# Patient Record
Sex: MALE | Race: WHITE | NOT HISPANIC OR LATINO
[De-identification: names, ages, dates, MRNs, and addresses within clinical notes are randomized per-mention and may not be internally consistent; named-entity substitution may affect disease eponyms.]

---

## 2023-05-17 ENCOUNTER — TRANSCRIBE ORDERS (OUTPATIENT)
Dept: OTHER | Age: 38
End: 2023-05-17

## 2023-05-17 DIAGNOSIS — I86.2 PELVIC VARICES: Primary | ICD-10-CM

## 2023-10-25 ENCOUNTER — TELEPHONE (OUTPATIENT)
Dept: UROLOGY | Facility: CLINIC | Age: 38
End: 2023-10-25

## 2023-10-25 DIAGNOSIS — N46.9 MALE INFERTILITY: Primary | ICD-10-CM

## 2023-10-25 NOTE — TELEPHONE ENCOUNTER
Please place infertility orders for Dr. Vegas and send back to me.    Thank you,    aNnci casey Clinic Assistant- Surgical Specialties

## 2023-10-25 NOTE — TELEPHONE ENCOUNTER
Orders placed.     Routed to clinic assistant to contact patient and to send mychart and letters.     Nessa Bronson LPN on 10/25/2023 at 11:35 AM

## 2023-10-25 NOTE — TELEPHONE ENCOUNTER
Left generic voicemail for patient letting him know there will be a letter in the mail with further instructions. Letter sent. MyChart not available.    Nanci casey Clinic Assistant- Surgical Specialties

## 2023-11-06 ENCOUNTER — TELEPHONE (OUTPATIENT)
Dept: UROLOGY | Facility: CLINIC | Age: 38
End: 2023-11-06
Payer: COMMERCIAL

## 2023-11-06 NOTE — TELEPHONE ENCOUNTER
Requested medical records to be faxed to [phone] and all related imaging to be pushed to PACS from [facility] in [place].Imaging request form brought downstairs.

## 2023-11-06 NOTE — TELEPHONE ENCOUNTER
Spoke with patient. He is unable to get labs done prior to appointment. He will get them done afterwards.

## 2023-11-06 NOTE — TELEPHONE ENCOUNTER
[facility] Call Center    Phone Message    May a detailed message be left on voicemail: yes     Reason for Call: Other: Pt is calling to inform he received a letter on Friday stating he needs to get labs done. Pt is wondering if he needs to get them before his appt on 11/8/23 haresh/ nory. Please advise pt. Thank you.       Action Taken: Message routed to:  Other: [facility]    Travel Screening: Not Applicable

## 2023-11-06 NOTE — TELEPHONE ENCOUNTER
Nanci Dawn    11/6/23  9:58 AM  Note  Spoke with patient. He is unable to get labs done prior to appointment. He will get them done afterwards.           Documented in another encounter. Closing encounter    Nessa Bronson LPN on 11/6/2023 at 10:05 AM

## 2023-11-08 ENCOUNTER — TELEPHONE (OUTPATIENT)
Dept: UROLOGY | Facility: CLINIC | Age: 38
End: 2023-11-08

## 2023-11-08 ENCOUNTER — OFFICE VISIT (OUTPATIENT)
Dept: UROLOGY | Facility: CLINIC | Age: 38
End: 2023-11-08
Attending: UROLOGY
Payer: COMMERCIAL

## 2023-11-08 DIAGNOSIS — I86.2 PELVIC VARICES: ICD-10-CM

## 2023-11-08 PROCEDURE — 99204 OFFICE O/P NEW MOD 45 MIN: CPT | Performed by: UROLOGY

## 2023-11-08 RX ORDER — ESCITALOPRAM OXALATE 5 MG/1
TABLET ORAL
COMMUNITY
Start: 2023-11-02

## 2023-11-08 RX ORDER — TRAMADOL HYDROCHLORIDE 50 MG/1
50 TABLET ORAL EVERY 6 HOURS PRN
COMMUNITY
Start: 2023-07-31

## 2023-11-08 NOTE — TELEPHONE ENCOUNTER
[facility] Call Center    Phone Message    May a detailed message be left on voicemail: yes     Reason for Call: Other: Pt called in asking for a return phone call from Dr Vegas after today's appt. Pt states he was recently diagnosed with something and forgot to ask an important questions on if he was able to do or not do something. Pt would not go into specific details. Please review and call pt back to discuss further. Thank you.      Action Taken: [facility] UROLOGY ADULT CUATE MATHEWS [[id number]]    Travel Screening: Not Applicable

## 2023-11-08 NOTE — PROGRESS NOTES
HPI:  Tung Ba is a 38 year old male being seen for follow-up penile pain.    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  Status-post left sperm granuloma excision 2015 and eventually vasovasostomy by me 2016 which fully resolved his testis pain. He's subsequently had another child since the vasovasostomy.    Started having some nonspecific new penile and pelvic pain starting March 2021- running and working out a lot at that time, which is the only thing he can think of that might have triggered this.  When symptoms first come on, he had bad penile pain, ED, semen? Leakage/crusty discharge at that time also.  History of pelvic floor physical therapy which was unhelpful.    He does have a bit of an atopic picture- history of strange joint pain.  Has consulted with rheum service in the past.      No history of Dupuytren's contractures.    In last many most, he noted a bit of a persisting erection after intercourse.  The erection persists, and is painful. Associated with some ventral shaft skin swelling as is seen in a photo.    He continues to get some penile pain after lower body exercises- legs or abdomen exercises/ core workout.  Core exercises will set of the whole discomfort for a few weeks, even.    Prior to 3/2021 symptoms, he had MRI for back spasms:  IMPRESSION: Small right-sided area of L3-4 disc prolapse with superior extrusion, but no nerve root impingement and no spinal stenosis or neural foraminal compromise at this level.     He tried Lexapro which did seem to help symptom-wise- (feeling of numbing)  But was not a cure.    Exam:  Physical Exam  There were no vitals taken for this visit.    General: Alert, oriented, nad.  Pleasant and conversant.  Eyes: anicteric, EOMI.  Pulse: regular  Resps: normal, non-labored.  Abdomen:  Nondistended.  Neurological - no tremors  Skin - no discoloration/ lesions noted   exam   Phallus circumcised.  There is some penile fibrosis in  bilateral distal corpora, symmetric left to right. There is a firm distal septal/ ventral septal plaque obvious on exam today.  This is somewhat tender to palpation.  Testes ++, anodular, nontender.  Vas and epididymis present and normal bilaterally  No varicocele noted.  JOSI deferred.    Review of Imaging:  I don't have access to his newest pelvic MRI from [place].  Reviewed past MRI Oct 2020 that was not related to the current complaints.    Review of Labs:  N/A     Assessment & Plan   1. Penile fibrosis, consistent with Peyronie's disease.  The site of palpable plaque correlates with the site of his penile pain.  Not having curvature or current erectile dysfunction symptoms.  a. Recommended conservative management, observation, take care not to injure penis, avoid intercourse with a less than firm erection, NSAIDS.  b. Consider low intensity shock wave for penile pain.  2. We discussed that the rest of the body aches with exercise are likely unrelated to the Peyronie's disease.  These may be related to his overall atopic /inflammatory picture.  3. Fully resolved post-vasectomy pain syndrome after bilateral vasovasostomy.    I'm happy to see him back in the future as needed.       Tyrone Vegas MD  [facility]    ==========================  Additional Coding Information:    Problems:  4 -- one or more chronic illnesses with exacerbation or side effects    Data Reviewed  MRI 2020 reviewed.    Tests ordered: N/A     Level of risk:  3 -- low risk (e.g., OTC medication or observation, minor surgery without risks)    Time spent:  49 minutes spent by me on the date of the encounter doing chart review, history and exam, documentation and further activities per the note

## 2023-11-09 ENCOUNTER — TELEPHONE (OUTPATIENT)
Dept: UROLOGY | Facility: CLINIC | Age: 38
End: 2023-11-09
Payer: COMMERCIAL

## 2023-11-09 NOTE — TELEPHONE ENCOUNTER
Call Back  (Newest Message First)  View All Conversations on this Encounter  Tyrone Vegas MD  You18 minutes ago (11:01 AM)     [facility]  Usually the Peyronie's disease comes from an injury.  Most of this time this is a subtle injury that is not even bad enough to notice or remember.  Most men don't remember any injury.  Best advice to prevent worsening is to just be very careful with any sexual activity.  Best to avoid intercourse when the erection is not very rigid, as a soft erection is more prone to bending/ re-injury.    Milking the bulbar urethra to prevent post-void dribbling would not cause this.    Thanks  [facility]     Called pt back to relay Dr. Vegas's information.  Pt verbalized understanding and has no additional questions at this time.     Latricia Rowe RN

## 2023-11-09 NOTE — TELEPHONE ENCOUNTER
Faxed second request for medical records from [facility] to [phone].    Nanci casey Clinic Assistant- Surgical Specialties

## 2023-11-09 NOTE — TELEPHONE ENCOUNTER
"Called pt back.  He wanted to discuss what Dr. Vegas diagnosed him with 11/8/23.      Reviewed  Bodies notes.  Penile fibrosis, consistent with Peyronie's disease and fully resolved post-vasectomy pain syndrome after bilateral vasovasostomy.      Pt verbalized that he understood the diagnosis but wanted to know if he was doing anything that contributed to his problems.  Pt states for \"25 years, after he is done peeing, he needs to help kind of drain the urine out from the base of his penis out.\"       Routing message to Dr. Vegas regarding cause of penile fibrosis, consistent with Peyronie's disease.  Latricia Rowe RN             "

## 2023-11-14 ENCOUNTER — TELEPHONE (OUTPATIENT)
Dept: UROLOGY | Facility: CLINIC | Age: 38
End: 2023-11-14
Payer: COMMERCIAL

## 2023-11-14 NOTE — TELEPHONE ENCOUNTER
3rd request faxed to [phone] for medical records.    Nanci casey Clinic Assistant- Surgical Specialties

## 2024-01-02 ENCOUNTER — TRANSCRIBE ORDERS (OUTPATIENT)
Dept: OTHER | Age: 39
End: 2024-01-02

## 2024-01-02 DIAGNOSIS — G89.29 OTHER CHRONIC PAIN: ICD-10-CM

## 2024-01-02 DIAGNOSIS — R10.2 PELVIC AND PERINEAL PAIN: Primary | ICD-10-CM
